# Patient Record
Sex: MALE | Employment: OTHER | URBAN - METROPOLITAN AREA
[De-identification: names, ages, dates, MRNs, and addresses within clinical notes are randomized per-mention and may not be internally consistent; named-entity substitution may affect disease eponyms.]

---

## 2019-02-19 ENCOUNTER — HOSPITAL ENCOUNTER (EMERGENCY)
Facility: CLINIC | Age: 34
Discharge: HOME | End: 2019-02-19
Attending: EMERGENCY MEDICINE | Admitting: EMERGENCY MEDICINE
Payer: COMMERCIAL

## 2019-02-19 VITALS
WEIGHT: 310 LBS | DIASTOLIC BLOOD PRESSURE: 97 MMHG | TEMPERATURE: 98.6 F | SYSTOLIC BLOOD PRESSURE: 171 MMHG | HEIGHT: 77 IN | BODY MASS INDEX: 36.6 KG/M2 | HEART RATE: 110 BPM | OXYGEN SATURATION: 94 % | RESPIRATION RATE: 18 BRPM

## 2019-02-19 DIAGNOSIS — K04.7 DENTAL ABSCESS: ICD-10-CM

## 2019-02-19 PROCEDURE — 64400 NJX AA&/STRD TRIGEMINAL NRV: CPT

## 2019-02-19 PROCEDURE — 99283 EMERGENCY DEPT VISIT LOW MDM: CPT | Mod: 25

## 2019-02-19 RX ORDER — HYDROCODONE BITARTRATE AND ACETAMINOPHEN 5; 325 MG/1; MG/1
1 TABLET ORAL EVERY 6 HOURS PRN
Qty: 4 TABLET | Refills: 0 | Status: SHIPPED | OUTPATIENT
Start: 2019-02-19

## 2019-02-19 RX ORDER — AMOXICILLIN 500 MG/1
500 CAPSULE ORAL 3 TIMES DAILY
Qty: 21 CAPSULE | Refills: 0 | Status: SHIPPED | OUTPATIENT
Start: 2019-02-19 | End: 2019-02-26

## 2019-02-19 SDOH — HEALTH STABILITY: MENTAL HEALTH: HOW OFTEN DO YOU HAVE A DRINK CONTAINING ALCOHOL?: NEVER

## 2019-02-19 ASSESSMENT — MIFFLIN-ST. JEOR: SCORE: 2458.53

## 2019-02-19 NOTE — ED AVS SNAPSHOT
Emergency Department  [address] [place] [id number]  Phone:  [phone]  Fax:  [phone]                                    Cesar Valles   MRN: [id number]    Department:   Emergency Department   Date of Visit:  2/19/2019           After Visit Summary Signature Page    I have received my discharge instructions, and my questions have been answered. I have discussed any challenges I see with this plan with the nurse or doctor.    ..........................................................................................................................................  Patient/Patient Representative Signature      ..........................................................................................................................................  Patient Representative Print Name and Relationship to Patient    ..................................................               ................................................  Date                                   Time    ..........................................................................................................................................  Reviewed by Signature/Title    ...................................................              ..............................................  Date                                               Time          22EPIC Rev 08/18

## 2019-02-19 NOTE — ED PROVIDER NOTES
"  History     Chief Complaint:    Dental Pain    HPI   Cesar Valles is a 33 year old male who presents with dental pain. The patient reports that he chipped his left lower molar tooth 2 weeks ago and then chipped it again 2 days ago. Since these incidents, he has been experiencing dental pain. He bit into something last night and he been experiencing increasing amounts of pian. Of note, the patient is visiting [place] from out of state.     Allergies:  No known drug allergies.      Medications:    No current medications.     Past Medical History:    Medical history reviewed. No pertinent medical history.      Past Surgical History:    Past surgical history reviewed. No pertinent past surgical history.     Family History:    Family history reviewed. No pertinent family history.     Social History:  The patient was accompanied to the ED by himself.  Smoking Status: Never Smoker  Smokeless Tobacco: Never Used  Alcohol Use: Negative   Drug Use: Negativen  PCP: No primary care provider on file.   Marital Status:       Review of Systems   HENT: Positive for dental problem.    All other systems reviewed and are negative.    Physical Exam     Patient Vitals for the past 24 hrs:   BP Temp Temp src Pulse Heart Rate Resp SpO2 Height Weight   02/19/19 0225 (!) 171/97 -- -- -- -- -- -- -- --   02/19/19 0143 (!) 165/101 98.6  F (37  C) Oral 110 110 18 94 % 1.956 m (6' 5\") 140.6 kg (310 lb)      Physical Exam    Constitutional: The patient is oriented to person, place, and time.   HENT:   Head: Atraumatic  Right Ear: Normal  Left Ear: Normal  Nose: Nose normal.   Mouth/Throat: Oropharynx is clear and moist. Swelling of left sided jaw. Cracked 1st premolar on the lower left jaw with gingival swelling and inflammation. No active drainage.   Eyes: Conjunctivae and EOM are normal. Pupils are equal, round, and reactive to light. No discharge  Neck: Normal range of motion. Neck supple.   Lymphadenopathy: The patient has no cervical " adenopathy.   Neurological: The patient is alert and oriented to person, place, and time. The patient has normal strength and normal reflexes. No cranial nerve deficit. Coordination normal.   Skin: Skin is warm and dry. No rash noted. The patient is not diaphoretic.   Psychiatric: The patient has a normal mood and affect.    Emergency Department Course     Procedures:    PROCEDURE NOTE:    PROCEDURE:  Inferior alveolar nerve block on the left side.   INDICATION:  Jaw pain  PHYSICIAN:  Francesco Ramon MD  DESCRIPTION OF PROCEDURE: Informed verbal consent.  Site correctly identified.  Using dental syringe and 0.5% bupivicaine with epinephrine, the left inferior alveolar nerve was anesthetized using standard technique.  Patient tolerated procedure well, no complications.     Dental Abscess Incision and Drainage Procedure Note:     Indication:  Swelling and fluctuance of gum line consistent with abscess    Location: Left #1 premolar    Anesthesia: Inferior alveloar nerve block, noted above.     Physician:  Francesco Ramon MD    Procedure:  Informed verbal consent was obtained.  Site was correctly identified.  An 18-gauge needle was inserted into the area of maximal fluctuance.  Purulence and fluids was noted at 2 ml.  Wound was left open to allow for continued drainage.  Plan is for QID salt water rinses and follow-up with dental/oral surgery.     Patient Status:  Patient tolerated the procedure well.  There were no complications.    Emergency Department Course:     Nursing notes and vitals reviewed.    0152 I performed an exam of the patient as documented above.     I personally reviewed the exam and procedure results with the patient and answered all related questions prior to discharge.    Impression & Plan      Medical Decision Making:  Cesar Valles is a 33 year old male who presents to the emergency department today for evaluation of 1 day history of increased dental pain and facial swelling. On exam, he has  clearly dental abscess just below the first left lower premolar where he has a partial fracture tooth. I performed an inferior alveloar nerve block with 2 ml of 0.5% bupivacaine with epinephrine. I then anesthestized locally and withdrew 2 ml of purulent material from the base of the tooth. Will place him on Amoxil for the next week. He is returning home on a flight tomorrow and will follow up with his dentist on his return.     Diagnosis:    ICD-10-CM    1. Dental abscess K04.7      Disposition:   The patient is discharged to home.     Discharge Medications:     Review of your medicines      START taking      Dose / Directions   amoxicillin 500 MG capsule  Commonly known as:  AMOXIL      Dose:  500 mg  Take 1 capsule (500 mg) by mouth 3 times daily for 7 days  Quantity:  21 capsule  Refills:  0     HYDROcodone-acetaminophen 5-325 MG tablet  Commonly known as:  NORCO      Dose:  1 tablet  Take 1 tablet by mouth every 6 hours as needed for severe pain  Quantity:  4 tablet  Refills:  0           Where to get your medicines      Some of these will need a paper prescription and others can be bought over the counter. Ask your nurse if you have questions.    Bring a paper prescription for each of these medications    amoxicillin 500 MG capsule    HYDROcodone-acetaminophen 5-325 MG tablet         Scribe Disclosure:  I, Orla Severson, am serving as a scribe at 1:50 AM on 2/19/2019 to document services personally performed by Francesco Ramon MD based on my observations and the provider's statements to me.      EMERGENCY DEPARTMENT       Francesco Ramon MD  02/19/19 2347